# Patient Record
Sex: FEMALE | Race: WHITE | Employment: FULL TIME | URBAN - NONMETROPOLITAN AREA
[De-identification: names, ages, dates, MRNs, and addresses within clinical notes are randomized per-mention and may not be internally consistent; named-entity substitution may affect disease eponyms.]

---

## 2017-06-16 ENCOUNTER — APPOINTMENT (OUTPATIENT)
Dept: GENERAL RADIOLOGY | Age: 37
End: 2017-06-16
Payer: COMMERCIAL

## 2017-06-16 ENCOUNTER — HOSPITAL ENCOUNTER (EMERGENCY)
Age: 37
Discharge: HOME | End: 2017-06-16
Payer: COMMERCIAL

## 2017-06-16 VITALS
DIASTOLIC BLOOD PRESSURE: 84 MMHG | WEIGHT: 152 LBS | BODY MASS INDEX: 25.33 KG/M2 | HEIGHT: 65 IN | SYSTOLIC BLOOD PRESSURE: 134 MMHG | RESPIRATION RATE: 18 BRPM | OXYGEN SATURATION: 94 % | HEART RATE: 70 BPM | TEMPERATURE: 98.1 F

## 2017-06-16 DIAGNOSIS — S16.1XXA NECK STRAIN, INITIAL ENCOUNTER: Primary | ICD-10-CM
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
## 2017-06-16 LAB
GLUCOSE BLD-MCNC: 123 MG/DL
GLUCOSE BLD-MCNC: 123 MG/DL (ref 70–99)
PERFORMED ON: ABNORMAL

## 2017-06-16 PROCEDURE — 99284 EMERGENCY DEPT VISIT MOD MDM: CPT

## 2017-06-16 PROCEDURE — 99283 EMERGENCY DEPT VISIT LOW MDM: CPT | Performed by: NURSE PRACTITIONER

## 2017-06-16 PROCEDURE — 72040 X-RAY EXAM NECK SPINE 2-3 VW: CPT

## 2017-06-16 PROCEDURE — 82948 REAGENT STRIP/BLOOD GLUCOSE: CPT

## 2017-06-16 RX ORDER — NAPROXEN 500 MG/1
500 TABLET ORAL 2 TIMES DAILY
Qty: 20 TABLET | Refills: 0 | Status: SHIPPED | OUTPATIENT
Start: 2017-06-16 | End: 2018-05-07 | Stop reason: ALTCHOICE

## 2017-06-16 RX ORDER — ORPHENADRINE CITRATE 30 MG/ML
60 INJECTION INTRAMUSCULAR; INTRAVENOUS EVERY 12 HOURS
COMMUNITY
End: 2017-06-16 | Stop reason: ALTCHOICE

## 2017-06-16 RX ORDER — CYCLOBENZAPRINE HCL 10 MG
10 TABLET ORAL 3 TIMES DAILY PRN
Qty: 20 TABLET | Refills: 0 | Status: SHIPPED | OUTPATIENT
Start: 2017-06-16 | End: 2017-09-14 | Stop reason: SDUPTHER

## 2017-06-16 ASSESSMENT — ENCOUNTER SYMPTOMS: RESPIRATORY NEGATIVE: 1

## 2017-06-16 ASSESSMENT — PAIN SCALES - GENERAL: PAINLEVEL_OUTOF10: 3

## 2017-09-14 ENCOUNTER — OFFICE VISIT (OUTPATIENT)
Dept: URGENT CARE | Age: 37
End: 2017-09-14
Payer: COMMERCIAL

## 2017-09-14 ENCOUNTER — HOSPITAL ENCOUNTER (OUTPATIENT)
Dept: CT IMAGING | Age: 37
Discharge: HOME | End: 2017-09-14
Payer: COMMERCIAL

## 2017-09-14 VITALS
BODY MASS INDEX: 25.99 KG/M2 | SYSTOLIC BLOOD PRESSURE: 124 MMHG | HEART RATE: 65 BPM | HEIGHT: 65 IN | WEIGHT: 156 LBS | TEMPERATURE: 98.6 F | DIASTOLIC BLOOD PRESSURE: 80 MMHG | OXYGEN SATURATION: 99 % | RESPIRATION RATE: 16 BRPM

## 2017-09-14 DIAGNOSIS — M79.18 RHOMBOID MUSCLE PAIN: ICD-10-CM

## 2017-09-14 DIAGNOSIS — S46.812S TRAPEZIUS STRAIN, LEFT, SEQUELA: ICD-10-CM
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
## 2017-09-14 PROCEDURE — 70490 CT SOFT TISSUE NECK W/O DYE: CPT

## 2017-09-14 PROCEDURE — 99202 OFFICE O/P NEW SF 15 MIN: CPT | Performed by: NURSE PRACTITIONER

## 2017-09-14 RX ORDER — CYCLOBENZAPRINE HCL 10 MG
10 TABLET ORAL 3 TIMES DAILY PRN
Qty: 20 TABLET | Refills: 0 | Status: SHIPPED | OUTPATIENT
Start: 2017-09-14 | End: 2018-05-07 | Stop reason: ALTCHOICE

## 2017-09-14 RX ORDER — MELOXICAM 15 MG/1
15 TABLET ORAL DAILY
Qty: 30 TABLET | Refills: 0 | Status: SHIPPED | OUTPATIENT
Start: 2017-09-14 | End: 2018-05-07 | Stop reason: ALTCHOICE

## 2017-09-14 ASSESSMENT — ENCOUNTER SYMPTOMS: BACK PAIN: 1

## 2018-05-07 ENCOUNTER — APPOINTMENT (OUTPATIENT)
Dept: GENERAL RADIOLOGY | Age: 38
End: 2018-05-07
Payer: COMMERCIAL

## 2018-05-07 ENCOUNTER — HOSPITAL ENCOUNTER (EMERGENCY)
Age: 38
Discharge: HOME | End: 2018-05-07
Payer: COMMERCIAL

## 2018-05-07 VITALS
SYSTOLIC BLOOD PRESSURE: 131 MMHG | DIASTOLIC BLOOD PRESSURE: 83 MMHG | WEIGHT: 162 LBS | BODY MASS INDEX: 27.66 KG/M2 | HEIGHT: 64 IN | TEMPERATURE: 97.5 F | HEART RATE: 101 BPM | RESPIRATION RATE: 18 BRPM | OXYGEN SATURATION: 97 %
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
## 2018-05-07 DIAGNOSIS — S29.012A UPPER BACK STRAIN, INITIAL ENCOUNTER: ICD-10-CM

## 2018-05-07 PROCEDURE — 72072 X-RAY EXAM THORAC SPINE 3VWS: CPT

## 2018-05-07 PROCEDURE — 99283 EMERGENCY DEPT VISIT LOW MDM: CPT | Performed by: NURSE PRACTITIONER

## 2018-05-07 PROCEDURE — 99283 EMERGENCY DEPT VISIT LOW MDM: CPT

## 2018-05-07 RX ORDER — CYCLOBENZAPRINE HCL 10 MG
10 TABLET ORAL 3 TIMES DAILY PRN
Qty: 30 TABLET | Refills: 0 | Status: SHIPPED | OUTPATIENT
Start: 2018-05-07 | End: 2018-05-17

## 2018-05-07 RX ORDER — NAPROXEN 500 MG/1
500 TABLET ORAL 2 TIMES DAILY
Qty: 20 TABLET | Refills: 0 | Status: SHIPPED | OUTPATIENT
Start: 2018-05-07 | End: 2019-09-18

## 2018-05-07 ASSESSMENT — PAIN SCALES - GENERAL: PAINLEVEL_OUTOF10: 5

## 2018-08-05 ENCOUNTER — OFFICE VISIT (OUTPATIENT)
Dept: RETAIL CLINIC | Facility: CLINIC | Age: 38
End: 2018-08-05

## 2018-08-05 VITALS
RESPIRATION RATE: 18 BRPM | BODY MASS INDEX: 27.09 KG/M2 | HEART RATE: 91 BPM | SYSTOLIC BLOOD PRESSURE: 114 MMHG | TEMPERATURE: 98.4 F | OXYGEN SATURATION: 98 % | HEIGHT: 65 IN | WEIGHT: 162.6 LBS | DIASTOLIC BLOOD PRESSURE: 70 MMHG

## 2018-08-05 DIAGNOSIS — J06.9 ACUTE URI: Primary | ICD-10-CM

## 2018-08-05 LAB
EXPIRATION DATE: NORMAL
INTERNAL CONTROL: NORMAL
Lab: NORMAL
S PYO AG THROAT QL: NEGATIVE

## 2018-08-05 PROCEDURE — 99203 OFFICE O/P NEW LOW 30 MIN: CPT | Performed by: NURSE PRACTITIONER

## 2018-08-05 PROCEDURE — 87880 STREP A ASSAY W/OPTIC: CPT | Performed by: NURSE PRACTITIONER

## 2018-08-05 RX ORDER — CEFDINIR 300 MG/1
300 CAPSULE ORAL 2 TIMES DAILY
Qty: 20 CAPSULE | Refills: 0
Start: 2018-08-05 | End: 2018-08-15

## 2018-08-05 NOTE — PATIENT INSTRUCTIONS
"Upper Respiratory Infection, Adult  Most upper respiratory infections (URIs) are a viral infection of the air passages leading to the lungs. A URI affects the nose, throat, and upper air passages. The most common type of URI is nasopharyngitis and is typically referred to as \"the common cold.\"  URIs run their course and usually go away on their own. Most of the time, a URI does not require medical attention, but sometimes a bacterial infection in the upper airways can follow a viral infection. This is called a secondary infection. Sinus and middle ear infections are common types of secondary upper respiratory infections.  Bacterial pneumonia can also complicate a URI. A URI can worsen asthma and chronic obstructive pulmonary disease (COPD). Sometimes, these complications can require emergency medical care and may be life threatening.  What are the causes?  Almost all URIs are caused by viruses. A virus is a type of germ and can spread from one person to another.  What increases the risk?  You may be at risk for a URI if:  · You smoke.  · You have chronic heart or lung disease.  · You have a weakened defense (immune) system.  · You are very young or very old.  · You have nasal allergies or asthma.  · You work in crowded or poorly ventilated areas.  · You work in health care facilities or schools.    What are the signs or symptoms?  Symptoms typically develop 2-3 days after you come in contact with a cold virus. Most viral URIs last 7-10 days. However, viral URIs from the influenza virus (flu virus) can last 14-18 days and are typically more severe. Symptoms may include:  · Runny or stuffy (congested) nose.  · Sneezing.  · Cough.  · Sore throat.  · Headache.  · Fatigue.  · Fever.  · Loss of appetite.  · Pain in your forehead, behind your eyes, and over your cheekbones (sinus pain).  · Muscle aches.    How is this diagnosed?  Your health care provider may diagnose a URI by:  · Physical exam.  · Tests to check that your " symptoms are not due to another condition such as:  ? Strep throat.  ? Sinusitis.  ? Pneumonia.  ? Asthma.    How is this treated?  A URI goes away on its own with time. It cannot be cured with medicines, but medicines may be prescribed or recommended to relieve symptoms. Medicines may help:  · Reduce your fever.  · Reduce your cough.  · Relieve nasal congestion.    Follow these instructions at home:  · Take medicines only as directed by your health care provider.  · Gargle warm saltwater or take cough drops to comfort your throat as directed by your health care provider.  · Use a warm mist humidifier or inhale steam from a shower to increase air moisture. This may make it easier to breathe.  · Drink enough fluid to keep your urine clear or pale yellow.  · Eat soups and other clear broths and maintain good nutrition.  · Rest as needed.  · Return to work when your temperature has returned to normal or as your health care provider advises. You may need to stay home longer to avoid infecting others. You can also use a face mask and careful hand washing to prevent spread of the virus.  · Increase the usage of your inhaler if you have asthma.  · Do not use any tobacco products, including cigarettes, chewing tobacco, or electronic cigarettes. If you need help quitting, ask your health care provider.  How is this prevented?  The best way to protect yourself from getting a cold is to practice good hygiene.  · Avoid oral or hand contact with people with cold symptoms.  · Wash your hands often if contact occurs.    There is no clear evidence that vitamin C, vitamin E, echinacea, or exercise reduces the chance of developing a cold. However, it is always recommended to get plenty of rest, exercise, and practice good nutrition.  Contact a health care provider if:  · You are getting worse rather than better.  · Your symptoms are not controlled by medicine.  · You have chills.  · You have worsening shortness of breath.  · You have  brown or red mucus.  · You have yellow or brown nasal discharge.  · You have pain in your face, especially when you bend forward.  · You have a fever.  · You have swollen neck glands.  · You have pain while swallowing.  · You have white areas in the back of your throat.  Get help right away if:  · You have severe or persistent:  ? Headache.  ? Ear pain.  ? Sinus pain.  ? Chest pain.  · You have chronic lung disease and any of the following:  ? Wheezing.  ? Prolonged cough.  ? Coughing up blood.  ? A change in your usual mucus.  · You have a stiff neck.  · You have changes in your:  ? Vision.  ? Hearing.  ? Thinking.  ? Mood.  This information is not intended to replace advice given to you by your health care provider. Make sure you discuss any questions you have with your health care provider.  Document Released: 06/13/2002 Document Revised: 08/20/2017 Document Reviewed: 03/25/2015  [employer] Interactive Patient Education © 2018 [employer]    Use saline nasal spray if needed.   Your strep test was negative. Often sore throats are caused by viruses or allergies. Increase fluid intake. Gargle warm salt water as needed for comfort.  Continue Claritin and Flonase to reduce post nasal drainage which is likely irritating your throat. If symptoms persist after a week or if they worsen please see your primary care provider.     You have been given a paper prescription for an antibiotic. We have dicussed instructions for self care at home including the use of over the counter medications.  If your symptoms do not improve or if an increase in symptoms occurs over the next 48-72 hours, you are to start the antibiotic.  If you are significantly worse you will need to be seen by a healthcare provider.     If you improve please shred the prescription and throw it away.  Do not save it for another illness.  Antibiotics are medications that need to be taken as directed, for the length of time directed and only if prescribed for a  specific illness by a healthcare provider.  Antibiotics fight bacterial infections only. They will not be effective for viral illnesses. Exposure to an antibiotic unnecessarily can make them ineffective when you need them in the future. You verbalized understanding of these instructions and agreed to follow them.

## 2018-08-05 NOTE — PROGRESS NOTES
Chief Complaint   Patient presents with   • Sinus Problem   • Nasal Congestion   • Sore Throat     Subjective   Sveta Santacruz is a 38 y.o. female who presents to the clinic today with complaints of sinus pressure, sore throat, runny nose. 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   Sinus Problem   This is a new problem. Episode onset: two days ago. There has been no fever. Associated symptoms include congestion, coughing, sinus pressure and a sore throat. Pertinent negatives include no chills, ear pain, headaches or shortness of breath. Treatments tried: Netipot, Flonase. The treatment provided no relief.   Sore Throat    This is a new problem. Episode onset: a little last week which improved. Then again the last couple of days. Neither side of throat is experiencing more pain than the other. There has been no fever. The pain is mild. Associated symptoms include congestion and coughing. Pertinent negatives include no abdominal pain, diarrhea, ear pain, headaches, shortness of breath, trouble swallowing or vomiting. She has had no exposure to strep or mono.     Current Outpatient Prescriptions:   •  Acetaminophen (MIDOL PO), Take  by mouth., Disp: , Rfl:   •  Fluticasone Propionate (FLONASE NA), into the nostril(s) as directed by provider., Disp: , Rfl:     Allergies:  Penicillins and Azithromycin    Past Medical History:   Diagnosis Date   • ADHD (attention deficit hyperactivity disorder)    • Allergic    • Arthritis    • Bronchitis    • Ear infection    • Migraines    • Urinary tract infection      History reviewed. No pertinent surgical history.  Family History   Problem Relation Age of Onset   • Stroke Mother    • Obesity Mother    • Diabetes Father    • Obesity Father      Social History   Substance Use Topics   • Smoking status: Current Every Day Smoker     Packs/day: 0.50     Years: 10.00   • Smokeless tobacco: Not on file   • Alcohol use No       Review of Systems  Review of  "Systems   Constitutional: Negative for chills and fever.   HENT: Positive for congestion, postnasal drip, sinus pressure and sore throat. Negative for ear pain, sinus pain and trouble swallowing.    Eyes: Negative.    Respiratory: Positive for cough. Negative for shortness of breath.    Gastrointestinal: Negative for abdominal pain, diarrhea and vomiting.   Neurological: Negative for headaches.       Objective   /70 (BP Location: Left arm, Patient Position: Sitting, Cuff Size: Adult)   Pulse 91   Temp 98.4 °F (36.9 °C) (Oral)   Resp 18   Ht 163.8 cm (64.5\")   Wt 73.8 kg (162 lb 9.6 oz)   LMP 08/04/2018 (Exact Date)   SpO2 98%   BMI 27.48 kg/m²       Physical Exam   Constitutional: She is oriented to person, place, and time. She appears well-developed and well-nourished. She is cooperative.   HENT:   Head: Normocephalic and atraumatic.   Right Ear: Tympanic membrane, external ear and ear canal normal.   Left Ear: Tympanic membrane, external ear and ear canal normal.   Nose: Nose normal. No sinus tenderness.   Mouth/Throat: Uvula is midline and mucous membranes are normal. Posterior oropharyngeal erythema (with mild cobblestoning) present. Tonsils are 2+ on the right. Tonsils are 2+ on the left. Tonsillar exudate (tiny white area right tonsil).   Eyes: Pupils are equal, round, and reactive to light. Conjunctivae, EOM and lids are normal.   Neck: Trachea normal and normal range of motion. Neck supple.   Cardiovascular: Normal rate, regular rhythm, S1 normal, S2 normal and normal heart sounds.    Pulmonary/Chest: Effort normal and breath sounds normal. She has no wheezes. She has no rhonchi. She has no rales.   Lymphadenopathy:     She has no cervical adenopathy.   Neurological: She is alert and oriented to person, place, and time. Coordination and gait normal.   Skin: Skin is warm, dry and intact.   Psychiatric: She has a normal mood and affect. Her speech is normal and behavior is normal. " "      Assessment/Plan     Sveta was seen today for sinus problem, nasal congestion and sore throat.    Diagnoses and all orders for this visit:    Acute URI  -     POC Rapid Strep A    Other orders  -     cefdinir (OMNICEF) 300 MG capsule; Take 1 capsule by mouth 2 (Two) Times a Day for 10 days.      Lab Results   Component Value Date    RAPSCRN Negative 08/05/2018     \"Wait and see\" prescription for Cefdinir given and she agreed to follow instructions.     Use saline nasal spray if needed.   Your strep test was negative. Often sore throats are caused by viruses or allergies. Increase fluid intake. Gargle warm salt water as needed for comfort.  Continue Claritin and Flonase to reduce post nasal drainage which is likely irritating your throat. If symptoms persist after a week or if they worsen please see your primary care provider.       "

## 2019-09-18 ENCOUNTER — OFFICE VISIT (OUTPATIENT)
Dept: URGENT CARE | Age: 39
End: 2019-09-18
Payer: COMMERCIAL

## 2019-09-18 VITALS
RESPIRATION RATE: 18 BRPM | SYSTOLIC BLOOD PRESSURE: 124 MMHG | HEIGHT: 65 IN | OXYGEN SATURATION: 98 % | TEMPERATURE: 98.6 F | DIASTOLIC BLOOD PRESSURE: 78 MMHG | BODY MASS INDEX: 26.66 KG/M2 | HEART RATE: 61 BPM | WEIGHT: 160 LBS

## 2019-09-18 DIAGNOSIS — R10.9 FLANK PAIN: ICD-10-CM

## 2019-09-18 DIAGNOSIS — S39.012A LOW BACK STRAIN, INITIAL ENCOUNTER: Primary | ICD-10-CM

## 2019-09-18 LAB
APPEARANCE FLUID: NORMAL
BILIRUBIN, POC: NEGATIVE
BLOOD URINE, POC: NEGATIVE
CLARITY, POC: CLEAR
COLOR, POC: YELLOW
GLUCOSE URINE, POC: NEGATIVE
KETONES, POC: NEGATIVE
LEUKOCYTE EST, POC: NEGATIVE
NITRITE, POC: NEGATIVE
PH, POC: 5.5
PROTEIN, POC: NEGATIVE
SPECIFIC GRAVITY, POC: 1.02
UROBILINOGEN, POC: 0.2

## 2019-09-18 PROCEDURE — 81002 URINALYSIS NONAUTO W/O SCOPE: CPT | Performed by: NURSE PRACTITIONER

## 2019-09-18 PROCEDURE — 99213 OFFICE O/P EST LOW 20 MIN: CPT | Performed by: NURSE PRACTITIONER

## 2019-09-18 RX ORDER — CETIRIZINE HYDROCHLORIDE 10 MG/1
10 TABLET ORAL DAILY
COMMUNITY
End: 2021-10-28

## 2019-09-18 ASSESSMENT — ENCOUNTER SYMPTOMS
ABDOMINAL PAIN: 0
RESPIRATORY NEGATIVE: 1
BACK PAIN: 1
SHORTNESS OF BREATH: 0
VOMITING: 0
NAUSEA: 0
GASTROINTESTINAL NEGATIVE: 1
EYES NEGATIVE: 1
CONSTIPATION: 0
DIARRHEA: 0
ALLERGIC/IMMUNOLOGIC NEGATIVE: 1

## 2021-10-28 ENCOUNTER — OFFICE VISIT (OUTPATIENT)
Dept: OBGYN CLINIC | Age: 41
End: 2021-10-28
Payer: COMMERCIAL

## 2021-10-28 VITALS
HEIGHT: 66 IN | BODY MASS INDEX: 24.11 KG/M2 | SYSTOLIC BLOOD PRESSURE: 126 MMHG | DIASTOLIC BLOOD PRESSURE: 80 MMHG | WEIGHT: 150 LBS | HEART RATE: 98 BPM

## 2021-10-28 DIAGNOSIS — G89.29 CHRONIC RLQ PAIN: ICD-10-CM

## 2021-10-28 DIAGNOSIS — R10.31 CHRONIC RLQ PAIN: ICD-10-CM

## 2021-10-28 DIAGNOSIS — Z13.9 ENCOUNTER FOR SCREENING: ICD-10-CM

## 2021-10-28 DIAGNOSIS — Z00.00 ENCOUNTER FOR MEDICAL EXAMINATION TO ESTABLISH CARE: ICD-10-CM

## 2021-10-28 DIAGNOSIS — Z01.419 WOMEN'S ANNUAL ROUTINE GYNECOLOGICAL EXAMINATION: Primary | ICD-10-CM

## 2021-10-28 DIAGNOSIS — Z12.4 ENCOUNTER FOR SCREENING FOR CERVICAL CANCER: ICD-10-CM

## 2021-10-28 PROCEDURE — 99386 PREV VISIT NEW AGE 40-64: CPT | Performed by: NURSE PRACTITIONER

## 2021-10-28 RX ORDER — CYCLOBENZAPRINE HCL 10 MG
TABLET ORAL
COMMUNITY

## 2021-10-28 NOTE — PROGRESS NOTES
[address] [facility]  CNM Office Note    Concepcion Horton is a 35 y.o. female who presents today for her medical conditions/ complaints as noted below. Chief Complaint   Patient presents with   [facility] Regalado New Doctor    Gynecologic Exam       HPI  Pt presents today to establish care and for pap smear and breast exam.  She also complains of pain in RLQ area during what she thinks may be ovulation that has been going on for the last 2 years. Periods are regular with duration of 3-4days with normal flow. Fibrocystic breast tissue that causes some tenderness at times but tolerable    Last mammogram:  2021  Last pap smear: 2017  Last bone density: Never    Last colonoscopy:  Never        There is no problem list on file for this patient. Patient's last menstrual period was 10/07/2021 (approximate). No obstetric history on file. Past Medical History:   Diagnosis Date    History of cervical cancer      Past Surgical History:   Procedure Laterality Date    TONGUE SURGERY      4years old     Family History   Problem Relation Age of Onset    Cancer Other         Great Grandmother     Cancer Paternal Uncle         Pancreatic     Social History     Tobacco Use    Smoking status: Current Every Day Smoker     Packs/day: 0.50     Types: Cigarettes     Start date: 8/18/1996    Smokeless tobacco: Never Used   Substance Use Topics    Alcohol use: No       Current Outpatient Medications   Medication Sig Dispense Refill    cyclobenzaprine (FLEXERIL) 10 MG tablet cyclobenzaprine 10 mg tablet   TAKE 1 TABLET 3 TIMES A DAY BY ORAL ROUTE AS NEEDED. No current facility-administered medications for this visit. Allergies   Allergen Reactions    Pcn [Penicillins] Other (See Comments)     Yeast infections    Zithromax [Azithromycin] Rash     Vitals:    10/28/21 1406   BP: 126/80   Pulse: 98     Body mass index is 24.21 kg/m². Review of Systems   Constitutional: Negative. HENT: Negative.     Eyes: Negative. Respiratory: Negative. Cardiovascular: Negative. Gastrointestinal: Negative. Endocrine: Negative. Genitourinary: Positive for pelvic pain. Negative for dyspareunia, dysuria, menstrual problem, vaginal bleeding, vaginal discharge and vaginal pain. Musculoskeletal: Negative. Skin: Negative. Allergic/Immunologic: Negative. Neurological: Negative. Hematological: Negative. Psychiatric/Behavioral: Negative. Physical Exam  Vitals and nursing note reviewed. Constitutional:       Appearance: She is well-developed. HENT:      Head: Normocephalic and atraumatic. Eyes:      Conjunctiva/sclera: Conjunctivae normal.      Pupils: Pupils are equal, round, and reactive to light. Neck:      Thyroid: No thyromegaly. Trachea: No tracheal deviation. Cardiovascular:      Rate and Rhythm: Normal rate and regular rhythm. Heart sounds: Normal heart sounds. No murmur heard. Pulmonary:      Effort: Pulmonary effort is normal. No respiratory distress. Breath sounds: Normal breath sounds. Chest:   Breasts:      Right: Normal. No tenderness. Left: Normal. No tenderness. Comments: Breasts symmetrical without tenderness, masses, or nipple discharge. Nipples everted bilaterally. Fibrocystic tissue bilaterally    Abdominal:      General: There is no distension. Palpations: Abdomen is soft. Tenderness: There is abdominal tenderness in the right lower quadrant. There is no guarding. Genitourinary:     General: Normal vulva. Exam position: Lithotomy position. Labia:         Right: No rash or lesion. Left: No rash or lesion. Vagina: Normal. No vaginal discharge, erythema or lesions. Cervix: Normal.      Uterus: Normal. Not tender. Adnexa: Right adnexa normal and left adnexa normal.        Right: No mass, tenderness or fullness. Left: No mass, tenderness or fullness.      Musculoskeletal:         General: Normal range of motion. Cervical back: Normal range of motion and neck supple. Skin:     General: Skin is warm and dry. Capillary Refill: Capillary refill takes less than 2 seconds. Neurological:      Mental Status: She is alert and oriented to person, place, and time. Psychiatric:         Behavior: Behavior normal.         Thought Content: Thought content normal.         Judgment: Judgment normal.          Diagnosis Orders   1. Women's annual routine gynecological examination     2. Encounter for screening  CBC    Comprehensive Metabolic Panel    Lipid Panel    T4, Free    TSH without Reflex    PAP SMEAR   3. Encounter for screening for cervical cancer     4. Chronic RLQ pain     5. Encounter for medical examination to establish care         MEDICATIONS:  No orders of the defined types were placed in this encounter. ORDERS:  Orders Placed This Encounter   Procedures    CBC    Comprehensive Metabolic Panel    Lipid Panel    T4, Free    TSH without Reflex    PAP SMEAR    Human papillomavirus (HPV) DNA probe thin prep high risk       PLAN:  1. WWE- pap collected, SBE reviewed and CBE performed. Annual labs for later date ordered. Mammogram complete. 2. RLQ Pain- US ordered and likely r/t ovulation.

## 2021-11-05 LAB
HPV COMMENT: NORMAL
HPV TYPE 16: NOT DETECTED
HPV TYPE 18: NOT DETECTED
HPVOH (OTHER TYPES): NOT DETECTED

## 2021-11-14 ASSESSMENT — ENCOUNTER SYMPTOMS
EYES NEGATIVE: 1
RESPIRATORY NEGATIVE: 1
GASTROINTESTINAL NEGATIVE: 1
ALLERGIC/IMMUNOLOGIC NEGATIVE: 1

## 2024-07-18 ENCOUNTER — TELEPHONE (OUTPATIENT)
Dept: GASTROENTEROLOGY | Age: 44
End: 2024-07-18

## 2024-08-05 ENCOUNTER — TELEPHONE (OUTPATIENT)
Dept: GASTROENTEROLOGY | Age: 44
End: 2024-08-05

## 2024-08-05 NOTE — TELEPHONE ENCOUNTER
Called patient to remind them of their [facility]  on 8/[facility]  to arrive at 645     CONFIRMED  PATIENT HAS  INSTR & PRESCRIPTION

## 2024-08-08 ENCOUNTER — ANESTHESIA (OUTPATIENT)
Dept: OPERATING ROOM | Age: 44
End: 2024-08-08

## 2024-08-08 ENCOUNTER — ANESTHESIA EVENT (OUTPATIENT)
Dept: OPERATING ROOM | Age: 44
End: 2024-08-08

## 2024-08-08 ENCOUNTER — HOSPITAL ENCOUNTER (OUTPATIENT)
Age: 44
Setting detail: OUTPATIENT SURGERY
Discharge: HOME | End: 2024-08-08
Attending: INTERNAL MEDICINE | Admitting: INTERNAL MEDICINE

## 2024-08-08 ENCOUNTER — APPOINTMENT (OUTPATIENT)
Dept: OPERATING ROOM | Age: 44
End: 2024-08-08
Attending: INTERNAL MEDICINE

## 2024-08-08 VITALS
HEIGHT: 66 IN | OXYGEN SATURATION: 100 % | HEART RATE: 75 BPM | TEMPERATURE: 97.4 F | SYSTOLIC BLOOD PRESSURE: 114 MMHG | BODY MASS INDEX: 22.82 KG/M2 | RESPIRATION RATE: 18 BRPM | DIASTOLIC BLOOD PRESSURE: 64 MMHG | WEIGHT: 142 LBS

## 2024-08-08 LAB
CONTROL: NORMAL
PREGNANCY TEST URINE, POC: NORMAL

## 2024-08-08 PROCEDURE — G0121 COLON CA SCRN NOT HI RSK IND: HCPCS

## 2024-08-08 PROCEDURE — G8907 PT DOC NO EVENTS ON DISCHARG: HCPCS

## 2024-08-08 PROCEDURE — G8918 PT W/O PREOP ORDER IV AB PRO: HCPCS

## 2024-08-08 RX ORDER — PROPOFOL 10 MG/ML
INJECTION, EMULSION INTRAVENOUS PRN
Status: DISCONTINUED | OUTPATIENT
Start: 2024-08-08 | End: 2024-08-08 | Stop reason: SDUPTHER

## 2024-08-08 RX ORDER — LIDOCAINE HYDROCHLORIDE 10 MG/ML
INJECTION, SOLUTION EPIDURAL; INFILTRATION; INTRACAUDAL; PERINEURAL PRN
Status: DISCONTINUED | OUTPATIENT
Start: 2024-08-08 | End: 2024-08-08 | Stop reason: SDUPTHER

## 2024-08-08 RX ORDER — SODIUM CHLORIDE, SODIUM LACTATE, POTASSIUM CHLORIDE, CALCIUM CHLORIDE 600; 310; 30; 20 MG/100ML; MG/100ML; MG/100ML; MG/100ML
INJECTION, SOLUTION INTRAVENOUS CONTINUOUS
Status: DISCONTINUED | OUTPATIENT
Start: 2024-08-08 | End: 2024-08-08 | Stop reason: HOSPADM

## 2024-08-08 RX ADMIN — PROPOFOL 300 MG: 10 INJECTION, EMULSION INTRAVENOUS at 08:04

## 2024-08-08 RX ADMIN — LIDOCAINE HYDROCHLORIDE 30 MG: 10 INJECTION, SOLUTION EPIDURAL; INFILTRATION; INTRACAUDAL; PERINEURAL at 08:04

## 2024-08-08 RX ADMIN — SODIUM CHLORIDE, SODIUM LACTATE, POTASSIUM CHLORIDE, CALCIUM CHLORIDE: 600; 310; 30; 20 INJECTION, SOLUTION INTRAVENOUS at 07:28

## 2024-08-08 ASSESSMENT — PAIN - FUNCTIONAL ASSESSMENT
PAIN_FUNCTIONAL_ASSESSMENT: NONE - DENIES PAIN
PAIN_FUNCTIONAL_ASSESSMENT: NONE - DENIES PAIN

## 2024-08-08 ASSESSMENT — LIFESTYLE VARIABLES: SMOKING_STATUS: 1

## 2024-08-08 NOTE — H&P
Patient Name: Melissa Hatfield  : 1980  MRN: [id number]  DATE: 24    Allergies:   Allergies   Allergen Reactions    Pcn [Penicillins] Other (See Comments)     Yeast infections    Zithromax [Azithromycin] Rash        ENDOSCOPY  History and Physical    Procedure:    [] Diagnostic Colonoscopy       [x] Screening Colonoscopy  [] EGD      [] ERCP      [] EUS       [] Other    [x] Previous office notes/History and Physical reviewed from the patients chart. Please see EMR for further details of HPI. I have examined the patient's status immediately prior to the procedure and:      Indications/HPI:       [x] Screening              [] History of Polyps      []Fhx of colon CA/polyps [x]+Cologard/DNA/Stool Testing      Anesthesia:   [x] MAC [] Moderate Sedation   [] General   [] None     ROS: 12 pt review of systems was negative unless stated above    Medications:   Prior to Admission medications    Medication Sig Start Date End Date Taking? Authorizing Provider   cyclobenzaprine (FLEXERIL) 10 MG tablet cyclobenzaprine 10 mg tablet   TAKE 1 TABLET 3 TIMES A DAY BY ORAL ROUTE AS NEEDED.    Provider, MD Grisel       Past Medical History:  Past Medical History:   Diagnosis Date    History of cervical cancer        Past Surgical History:  Past Surgical History:   Procedure Laterality Date    TONGUE SURGERY      6 years old       Social History:  Social History     Tobacco Use    Smoking status: Every Day     Current packs/day: 0.50     Average packs/day: 0.5 packs/day for 28.0 years (14.0 ttl pk-yrs)     Types: Cigarettes     Start date: 1996    Smokeless tobacco: Never   Vaping Use    Vaping Use: Never used   Substance Use Topics    Alcohol use: No    Drug use: No       Vital Signs:   Vitals:    24 0721   BP: 113/74   Pulse: 66   Resp: 16   Temp: 99.2 °F (37.3 °C)   SpO2: 93%        Physical Exam:  Cardiac:  [x]WNL []Comments:  Pulmonary:  [x]WNL   []Comments:  Neuro/Mental Status:  [x]WNL

## 2024-08-08 NOTE — ANESTHESIA PRE PROCEDURE
Department of Anesthesiology  Preprocedure Note       Name:  Melissa Hatfield   Age:  44 y.o.  :  1980                                          MRN:  [id number]         Date:  2024      Surgeon: Surgeon(s):  Jose R Daniel MD    Procedure: Procedure(s):  COLORECTAL CANCER SCREENING, NOT HIGH RISK    Medications prior to admission:   Prior to Admission medications    Medication Sig Start Date End Date Taking? Authorizing Provider   cyclobenzaprine (FLEXERIL) 10 MG tablet cyclobenzaprine 10 mg tablet   TAKE 1 TABLET 3 TIMES A DAY BY ORAL ROUTE AS NEEDED.    Provider, MD Grisel       Current medications:    Current Facility-Administered Medications   Medication Dose Route Frequency Provider Last Rate Last Admin   • lactated ringers IV soln infusion   IntraVENous Continuous Jose R Daniel  mL/hr at 24 New Bag at 24       Allergies:    Allergies   Allergen Reactions   • Pcn [Penicillins] Other (See Comments)     Yeast infections   • Zithromax [Azithromycin] Rash       Problem List:  There is no problem list on file for this patient.      Past Medical History:        Diagnosis Date   • History of cervical cancer        Past Surgical History:        Procedure Laterality Date   • TONGUE SURGERY      6 years old       Social History:    Social History     Tobacco Use   • Smoking status: Every Day     Current packs/day: 0.50     Average packs/day: 0.5 packs/day for 28.0 years (14.0 ttl pk-yrs)     Types: Cigarettes     Start date: 1996   • Smokeless tobacco: Never   Substance Use Topics   • Alcohol use: No                                Ready to quit: Not Answered  Counseling given: Not Answered      Vital Signs (Current):   Vitals:    24 0721   BP: 113/74   Pulse: 66   Resp: 16   Temp: 99.2 °F (37.3 °C)   SpO2: 93%   Weight: 64.4 kg (142 lb)   Height: 1.664 m (5' 5.5\")                                              BP Readings from Last 3 Encounters:   24 113/74

## 2024-08-08 NOTE — DISCHARGE INSTRUCTIONS
Recommendations:  1. Repeat colonoscopy: 10 years for CRC screening.    POST-OP ORDERS: ENDOSCOPY & COLONOSCOPY:    1. Rest today.    2. DO NOT eat or drink until wide awake; eat your usual diet today in moderate amount only.    3. DO NOT drive today.    4. Call physician if you have severe pain, vomiting, fever, rectal bleeding or black bowel movements.    5.  If a biopsy was taken or a polyp removed, you should expect to hear results in about 7-10 days.  If you have heard nothing from your physician by then, call the office for results.    6.  Discharge home when patient awake, vitals signs stable and tolerating liquids.    7. Call with questions or concerns [phone].

## 2024-08-08 NOTE — OP NOTE
Patient: Melissa Hatfield : 1980  Med Rec#: [id number] Acc#: [id number]   Primary Care Provider Tan Valle MD    Date of Procedure:  2024    Endoscopist: Jose R Daniel MD    Referring Provider: Tan Valle MD    Operation Performed: Colonoscopy     Indications: Screening- + cologuard    Anesthesia:  Sedation was administered by anesthesia who monitored the patient during the procedure.    I met with Melissa Hatfield prior to procedure. We discussed the procedure itself, and I have discussed the risks of endoscopy (including-- but not limited to-- pain, discomfort, bleeding potentially requiring second endoscopic procedure and/or blood transfusion, organ perforation requiring operative repair, damage to organs near the colon, infection, aspiration, cardiopulmonary/allergic reaction), benefits, indications to endoscopy. Additionally, we discussed options other than colonoscopy. The patient expressed understanding. All questions answered. The patient decided to proceed with the procedure.  Signed informed consent was placed on the chart.    Blood Loss: minimal    Withdrawal time: > 6 min  Bowel Prep: adequate     Complications: no immediate complications    DESCRIPTION OF PROCEDURE:     A time out was performed. After written informed consent was obtained, the patient was placed in the left lateral position.     The perianal area was inspected, and a digital rectal exam was performed. A rectal exam was performed: normal tone, no palpable lesions. At this point, a forward viewing Olympus colonoscope was inserted into the anus and carefully advanced to the cecum.  The cecum was identified by the ileocecal valve and the appendiceal orifice. The colonoscope was then slowly withdrawn with careful inspection of the mucosa in a linear and circumferential fashion. The scope was retroflexed in the rectum. Suction was utilized during the procedure to remove as much air as possible from the bowel. The

## 2024-08-08 NOTE — ANESTHESIA POSTPROCEDURE EVALUATION
Department of Anesthesiology  Postprocedure Note    Patient: Melissa Hatfield  MRN: [id number]  YOB: 1980  Date of evaluation: 8/8/2024    Procedure Summary       Date: 08/08/24 Room / Location: [place] [address] / [facility] [place]    Anesthesia Start: 0756 Anesthesia Stop: 0822    Procedure: COLORECTAL CANCER SCREENING, NOT HIGH RISK (Abdomen) Diagnosis:       Screen for colon cancer      Family history of colon polyps, unspecified      (Screen for colon cancer [Z12.11])      (Family history of colon polyps, unspecified [Z83.719])    Surgeons: Jose R Daniel MD Responsible Provider: Dora Mares APRN - CRNA    Anesthesia Type: general, TIVA ASA Status: 3            Anesthesia Type: No value filed.    Daniele Phase I:      Daniele Phase II:      Anesthesia Post Evaluation    Patient location during evaluation: bedside  Patient participation: waiting for patient participation  Level of consciousness: sleepy but conscious  Pain score: 0  Airway patency: patent  Nausea & Vomiting: no nausea and no vomiting  Cardiovascular status: hemodynamically stable and blood pressure returned to baseline  Respiratory status: acceptable and room air  Hydration status: stable  Comments: /74   Pulse 66   Temp 97 °F (36.1 °C)   Resp 16   Ht 1.664 m (5' 5.5\")   Wt 64.4 kg (142 lb)   SpO2 97%   BMI 23.27 kg/m²     Pain management: adequate    No notable events documented.

## (undated) DEVICE — SUPPLEMENT DIGESTIVE H2O SOL GI-EASE

## (undated) DEVICE — SINGLE PORT MANIFOLD: Brand: NEPTUNE 2

## (undated) DEVICE — CANNULA NSL AD L7FT DIV O2 CO2 W/ M LUERLOCK TRMPT CONN

## (undated) DEVICE — COLON KIT WITH 1.1 OZ ORCA HYDRA SEAL 2 GOWN

## (undated) DEVICE — ADAPTER CLEANING PORPOISE CLEANING

## (undated) DEVICE — CLEANING SPONGE: Brand: KOALA™

## (undated) DEVICE — BRUSH ENDOSCP 2 END CHN HEDGEHOG